# Patient Record
Sex: FEMALE | Race: WHITE | NOT HISPANIC OR LATINO | Employment: FULL TIME | URBAN - METROPOLITAN AREA
[De-identification: names, ages, dates, MRNs, and addresses within clinical notes are randomized per-mention and may not be internally consistent; named-entity substitution may affect disease eponyms.]

---

## 2023-11-07 PROBLEM — M54.2 CERVICALGIA OF OCCIPITO-ATLANTO-AXIAL REGION: Status: ACTIVE | Noted: 2023-11-07

## 2023-11-07 PROBLEM — H52.201 ASTIGMATISM OF RIGHT EYE: Status: ACTIVE | Noted: 2023-11-07

## 2023-11-07 PROBLEM — T50.905A ADVERSE EFFECT OF DRUG/MEDICINAL: Status: ACTIVE | Noted: 2023-11-07

## 2023-11-07 PROBLEM — G89.29 CHRONIC SCAPULAR PAIN: Status: ACTIVE | Noted: 2023-11-07

## 2023-11-07 PROBLEM — R51.9 CHRONIC HEADACHES: Status: ACTIVE | Noted: 2023-11-07

## 2023-11-07 PROBLEM — G89.29 CHRONIC HEADACHES: Status: ACTIVE | Noted: 2023-11-07

## 2023-11-07 PROBLEM — F41.9 ANXIETY: Status: ACTIVE | Noted: 2023-11-07

## 2023-11-07 PROBLEM — H90.3 BILATERAL SENSORINEURAL HEARING LOSS: Status: ACTIVE | Noted: 2023-11-07

## 2023-11-07 PROBLEM — M89.8X1 CHRONIC SCAPULAR PAIN: Status: ACTIVE | Noted: 2023-11-07

## 2023-11-07 PROBLEM — M21.611 BUNION, RIGHT: Status: ACTIVE | Noted: 2023-11-07

## 2023-11-07 PROBLEM — H04.123 BILATERAL DRY EYES: Status: ACTIVE | Noted: 2023-11-07

## 2023-11-07 PROBLEM — H52.13 MYOPIA, BILATERAL: Status: ACTIVE | Noted: 2023-11-07

## 2023-11-07 PROBLEM — J34.89 NASAL DRAINAGE: Status: ACTIVE | Noted: 2023-11-07

## 2023-11-07 PROBLEM — K21.9 LARYNGOPHARYNGEAL REFLUX (LPR): Status: ACTIVE | Noted: 2023-11-07

## 2023-11-07 PROBLEM — R87.610 ASCUS WITH POSITIVE HIGH RISK HPV CERVICAL: Status: ACTIVE | Noted: 2023-11-07

## 2023-11-07 PROBLEM — N87.0 CIN I (CERVICAL INTRAEPITHELIAL NEOPLASIA I): Status: ACTIVE | Noted: 2023-11-07

## 2023-11-07 PROBLEM — L65.9 HAIR THINNING: Status: ACTIVE | Noted: 2023-11-07

## 2023-11-07 PROBLEM — R14.3 FLATULENCE: Status: ACTIVE | Noted: 2023-11-07

## 2023-11-07 PROBLEM — S60.559A SPLINTER OF HAND: Status: ACTIVE | Noted: 2023-11-07

## 2023-11-07 PROBLEM — J34.89 SINUS PRESSURE: Status: ACTIVE | Noted: 2023-11-07

## 2023-11-07 PROBLEM — R53.83 FATIGUE: Status: ACTIVE | Noted: 2023-11-07

## 2023-11-07 PROBLEM — L25.9 CONTACT DERMATITIS: Status: ACTIVE | Noted: 2023-11-07

## 2023-11-07 PROBLEM — R87.612 LOW GRADE SQUAMOUS INTRAEPITHELIAL LESION (LGSIL) ON PAPANICOLAOU SMEAR OF CERVIX: Status: ACTIVE | Noted: 2023-11-07

## 2023-11-07 PROBLEM — R44.8 FACIAL PRESSURE: Status: ACTIVE | Noted: 2023-11-07

## 2023-11-07 PROBLEM — Z96.21 COCHLEAR IMPLANT IN PLACE: Status: ACTIVE | Noted: 2023-11-07

## 2023-11-07 PROBLEM — R87.810 ASCUS WITH POSITIVE HIGH RISK HPV CERVICAL: Status: ACTIVE | Noted: 2023-11-07

## 2023-11-07 PROBLEM — G47.9 DIFFICULTY SLEEPING: Status: ACTIVE | Noted: 2023-11-07

## 2023-11-07 RX ORDER — CLOTRIMAZOLE AND BETAMETHASONE DIPROPIONATE 10; .64 MG/G; MG/G
1 CREAM TOPICAL 2 TIMES DAILY
COMMUNITY
Start: 2019-11-05 | End: 2023-11-08 | Stop reason: ALTCHOICE

## 2023-11-07 RX ORDER — MULTIVIT-MIN/IRON/FOLIC ACID/K 18-600-40
CAPSULE ORAL
COMMUNITY
End: 2023-11-08 | Stop reason: ALTCHOICE

## 2023-11-07 RX ORDER — ETONOGESTREL AND ETHINYL ESTRADIOL VAGINAL RING .015; .12 MG/D; MG/D
RING VAGINAL
COMMUNITY
Start: 2020-08-10 | End: 2023-11-08 | Stop reason: ALTCHOICE

## 2023-11-07 RX ORDER — MUPIROCIN 20 MG/G
OINTMENT TOPICAL 3 TIMES DAILY
COMMUNITY
Start: 2021-05-02 | End: 2023-11-08 | Stop reason: ALTCHOICE

## 2023-11-08 ENCOUNTER — LAB (OUTPATIENT)
Dept: LAB | Facility: LAB | Age: 28
End: 2023-11-08
Payer: COMMERCIAL

## 2023-11-08 ENCOUNTER — OFFICE VISIT (OUTPATIENT)
Dept: PRIMARY CARE | Facility: CLINIC | Age: 28
End: 2023-11-08
Payer: COMMERCIAL

## 2023-11-08 VITALS
SYSTOLIC BLOOD PRESSURE: 124 MMHG | DIASTOLIC BLOOD PRESSURE: 80 MMHG | OXYGEN SATURATION: 98 % | HEART RATE: 72 BPM | BODY MASS INDEX: 29.33 KG/M2 | HEIGHT: 69 IN | WEIGHT: 198 LBS

## 2023-11-08 DIAGNOSIS — Z83.49 FAMILY HISTORY OF THYROID DISEASE: ICD-10-CM

## 2023-11-08 DIAGNOSIS — Z82.49 FAMILY HISTORY OF HEART DISEASE: ICD-10-CM

## 2023-11-08 DIAGNOSIS — Z00.00 WELL ADULT EXAM: ICD-10-CM

## 2023-11-08 DIAGNOSIS — Z83.3 FAMILY HISTORY OF DIABETES MELLITUS: ICD-10-CM

## 2023-11-08 DIAGNOSIS — Z00.00 WELL ADULT EXAM: Primary | ICD-10-CM

## 2023-11-08 DIAGNOSIS — Z96.21 COCHLEAR IMPLANT IN PLACE: ICD-10-CM

## 2023-11-08 LAB
ALBUMIN SERPL-MCNC: 4.7 G/DL (ref 3.5–5)
ALP BLD-CCNC: 56 U/L (ref 35–125)
ALT SERPL-CCNC: 11 U/L (ref 5–40)
ANION GAP SERPL CALC-SCNC: 14 MMOL/L
APPEARANCE UR: CLEAR
AST SERPL-CCNC: 16 U/L (ref 5–40)
BILIRUB SERPL-MCNC: 0.5 MG/DL (ref 0.1–1.2)
BILIRUB UR STRIP.AUTO-MCNC: NEGATIVE MG/DL
BUN SERPL-MCNC: 8 MG/DL (ref 8–25)
CALCIUM SERPL-MCNC: 9.3 MG/DL (ref 8.5–10.4)
CHLORIDE SERPL-SCNC: 103 MMOL/L (ref 97–107)
CHOLEST SERPL-MCNC: 193 MG/DL (ref 133–200)
CHOLEST/HDLC SERPL: 2.4 {RATIO}
CO2 SERPL-SCNC: 23 MMOL/L (ref 24–31)
COLOR UR: NORMAL
CREAT SERPL-MCNC: 0.7 MG/DL (ref 0.4–1.6)
EST. AVERAGE GLUCOSE BLD GHB EST-MCNC: 94 MG/DL
GFR SERPL CREATININE-BSD FRML MDRD: >90 ML/MIN/1.73M*2
GLUCOSE SERPL-MCNC: 91 MG/DL (ref 65–99)
GLUCOSE UR STRIP.AUTO-MCNC: NORMAL MG/DL
HBA1C MFR BLD: 4.9 %
HDLC SERPL-MCNC: 82 MG/DL
KETONES UR STRIP.AUTO-MCNC: NEGATIVE MG/DL
LDLC SERPL CALC-MCNC: 94 MG/DL (ref 65–130)
LEUKOCYTE ESTERASE UR QL STRIP.AUTO: NEGATIVE
NITRITE UR QL STRIP.AUTO: NEGATIVE
PH UR STRIP.AUTO: 6 [PH]
POTASSIUM SERPL-SCNC: 4.4 MMOL/L (ref 3.4–5.1)
PROT SERPL-MCNC: 6.9 G/DL (ref 5.9–7.9)
PROT UR STRIP.AUTO-MCNC: NEGATIVE MG/DL
RBC # UR STRIP.AUTO: NEGATIVE /UL
SODIUM SERPL-SCNC: 140 MMOL/L (ref 133–145)
SP GR UR STRIP.AUTO: 1.02
TRIGL SERPL-MCNC: 84 MG/DL (ref 40–150)
TSH SERPL DL<=0.05 MIU/L-ACNC: 1.95 MIU/L (ref 0.27–4.2)
UROBILINOGEN UR STRIP.AUTO-MCNC: NORMAL MG/DL

## 2023-11-08 PROCEDURE — 81003 URINALYSIS AUTO W/O SCOPE: CPT

## 2023-11-08 PROCEDURE — 83036 HEMOGLOBIN GLYCOSYLATED A1C: CPT

## 2023-11-08 PROCEDURE — 99395 PREV VISIT EST AGE 18-39: CPT | Performed by: NURSE PRACTITIONER

## 2023-11-08 PROCEDURE — 84443 ASSAY THYROID STIM HORMONE: CPT

## 2023-11-08 PROCEDURE — 1036F TOBACCO NON-USER: CPT | Performed by: NURSE PRACTITIONER

## 2023-11-08 PROCEDURE — 36415 COLL VENOUS BLD VENIPUNCTURE: CPT

## 2023-11-08 PROCEDURE — 80053 COMPREHEN METABOLIC PANEL: CPT

## 2023-11-08 PROCEDURE — 80061 LIPID PANEL: CPT

## 2023-11-08 ASSESSMENT — PATIENT HEALTH QUESTIONNAIRE - PHQ9
SUM OF ALL RESPONSES TO PHQ9 QUESTIONS 1 AND 2: 0
1. LITTLE INTEREST OR PLEASURE IN DOING THINGS: NOT AT ALL
2. FEELING DOWN, DEPRESSED OR HOPELESS: NOT AT ALL

## 2023-11-08 ASSESSMENT — PAIN SCALES - GENERAL: PAINLEVEL: 0-NO PAIN

## 2023-11-08 NOTE — PROGRESS NOTES
"Subjective   Patient ID: Tracy Ballesteros is a 28 y.o. female who presents for CPE (New patient.).    HPI   Tracy is a 27 yo F new patient who presents for CPE  Hx of Cochlear implants (sees ENT regularly), abnormal pap (sees GYN - due 12/23).  PMH/FMH/SMH reviewed and updated  Maternal family hx includes afib,hypothyroid,DM  Paternal family hx includes HTN, heart  Works as realtor  No complaints today  Needs  for insurance paperwork    Review of Systems  Constitutional Symptoms: negative for fever, loss of appetite, headaches, fatigue.   Eyes: negative for loss and blurring of vision, double vision.   Ear, Nose, Mouth, Throat: negative for hearing loss, tinnitus, nasal congestion, rhinorrhea, nose bleeds, teeth problems, mouth sores, gum disease, dysphagia, sore throat.   Cardiovascular: negative for chest pain/pressure, palpitations, edema, claudication.   Respiratory: negative for shortness of breath, dyspnea on exertion, pain with breathing, coughing.   Breast: negative for tenderness, masses, gynecomastia.   Gastrointestinal: negative for anorexia, indigestion, nausea, vomiting, abdominal pain, change in bowel habits, diarrhea, constipation, hematochaezia, melena, blood in stool.   : Negative for urinary or vaginal complaints. History of abnormal Pap, most recent Paps normal  Musculoskeletal: negative for joint pain, joint swelling, myalgias, cramps.   Integumentary: negative for change in mole, skin trouble or rash.   Neurological: negative for headache, numbness, tingling, weakness, tremors.   Psychiatric: negative for depression, anxiety.   Endocrine: negative for weight gain, heat or cold intolerance, polyuria, polydipsia, polyphagia.   Hematologic/Lymphatic: negative for bruising, abnormal bleeding, swollen glands.      Objective   /80   Pulse 72   Ht 1.753 m (5' 9\")   Wt 89.8 kg (198 lb)   LMP 10/22/2023 (Exact Date)   SpO2 98%   BMI 29.24 kg/m²     Physical Exam  General Appearance: " Comfortable. She is well nourished, and well developed. She is awake, alert, and oriented and appears her stated age. The patient is cooperative with exam.  Head: Hair pattern reveals a normal pattern for patients age and The face shows no abnormalities .  Eyes: PERRLA, EOMI, conjunctiva and sclerae clear. Extraocular muscle exam reveals EOMI.  Ears, Nose, Mouth, Throat: Cochlear implants. NOSE: Nasal mucosa in both nostrils reveals no polyps, ulcerations, or lesions. Oral mucosa reveals no abnormalities and Teeth reveal good repair .  Neck: Neck reveals supple, no adenopathy, no thyromegaly, or carotid bruits.  Chest: Lungs are clear to auscultation bilaterally with no wheezes, rales, or rhonchi.  Cardiovascular: RRR without MRG.  Breast: GYN.  Abdomen: Abdomen is soft, NT, ND with no masses. No CVAT  Genitourinary: GYN  Lymph Nodes: Bilateral axillary lymph nodes are unremarkable. Bilateral inguinal lymph nodes are unremarkable.  Musculoskeletal: 5/5 and equal strength in bilateral upper and lower extremities.  Skin: Skin reveals good turgor and no rashes .  Neurological: Neuro: Intact and non-focal. Cranial nerves II - XII are grossly intact.  Psychiatric: Patient has appropriate judgement. Patient has good insight. Patient's mood is appropriate.    Assessment/Plan   Diagnoses and all orders for this visit:  Well adult exam  -     Comprehensive Metabolic Panel; Future  -     Hemoglobin A1C; Future  -     Lipid Panel; Future  -     Urinalysis with Reflex Microscopic; Future  -     TSH with reflex to Free T4 if abnormal; Future  Healthy diet and exercise   Safety  See GYN and ENT as discussed  Obtain flu shot (pt will get at pharmacy)  Blood work ordered and she will drop off needed paperwork for insurance  Follow up 1 year for CPE and prn  Cochlear implant in place  Family history of diabetes mellitus  -     Comprehensive Metabolic Panel; Future  -     Hemoglobin A1C; Future  Family history of heart disease  -      Comprehensive Metabolic Panel; Future  -     Lipid Panel; Future  Family history of thyroid disease  -     TSH with reflex to Free T4 if abnormal; Future

## 2023-11-08 NOTE — PATIENT INSTRUCTIONS
Thank you for choosing our office for your health care needs.     Please work on healthy lifestyle, including whole foods and regular exercise.     Blood work is ordered - please obtain at your convenience.   We can complete any needed forms once the results are back.     Please obtain your flu shot this season.

## 2023-11-10 ENCOUNTER — APPOINTMENT (OUTPATIENT)
Dept: OBSTETRICS AND GYNECOLOGY | Facility: CLINIC | Age: 28
End: 2023-11-10
Payer: COMMERCIAL

## 2023-12-01 ENCOUNTER — APPOINTMENT (OUTPATIENT)
Dept: OBSTETRICS AND GYNECOLOGY | Facility: CLINIC | Age: 28
End: 2023-12-01
Payer: COMMERCIAL

## 2023-12-29 ENCOUNTER — OFFICE VISIT (OUTPATIENT)
Dept: OBSTETRICS AND GYNECOLOGY | Facility: CLINIC | Age: 28
End: 2023-12-29
Payer: COMMERCIAL

## 2023-12-29 VITALS
HEIGHT: 69 IN | BODY MASS INDEX: 30.21 KG/M2 | WEIGHT: 204 LBS | DIASTOLIC BLOOD PRESSURE: 70 MMHG | SYSTOLIC BLOOD PRESSURE: 118 MMHG

## 2023-12-29 DIAGNOSIS — Z01.419 WELL WOMAN EXAM WITH ROUTINE GYNECOLOGICAL EXAM: Primary | ICD-10-CM

## 2023-12-29 PROBLEM — M21.611 BUNION, RIGHT: Status: RESOLVED | Noted: 2023-11-07 | Resolved: 2023-12-29

## 2023-12-29 PROBLEM — S60.559A SPLINTER OF HAND: Status: RESOLVED | Noted: 2023-11-07 | Resolved: 2023-12-29

## 2023-12-29 PROBLEM — R87.612 LOW GRADE SQUAMOUS INTRAEPITHELIAL LESION (LGSIL) ON PAPANICOLAOU SMEAR OF CERVIX: Status: RESOLVED | Noted: 2023-11-07 | Resolved: 2023-12-29

## 2023-12-29 PROBLEM — R51.9 CHRONIC HEADACHES: Status: RESOLVED | Noted: 2023-11-07 | Resolved: 2023-12-29

## 2023-12-29 PROBLEM — G89.29 CHRONIC HEADACHES: Status: RESOLVED | Noted: 2023-11-07 | Resolved: 2023-12-29

## 2023-12-29 PROBLEM — L65.9 HAIR THINNING: Status: RESOLVED | Noted: 2023-11-07 | Resolved: 2023-12-29

## 2023-12-29 PROBLEM — G89.29 CHRONIC SCAPULAR PAIN: Status: RESOLVED | Noted: 2023-11-07 | Resolved: 2023-12-29

## 2023-12-29 PROBLEM — R14.3 FLATULENCE: Status: RESOLVED | Noted: 2023-11-07 | Resolved: 2023-12-29

## 2023-12-29 PROBLEM — R87.610 ASCUS WITH POSITIVE HIGH RISK HPV CERVICAL: Status: RESOLVED | Noted: 2023-11-07 | Resolved: 2023-12-29

## 2023-12-29 PROBLEM — Z96.21 PRESENCE OF COCHLEAR PROSTHESIS: Status: ACTIVE | Noted: 2022-11-16

## 2023-12-29 PROBLEM — M54.2 CERVICALGIA OF OCCIPITO-ATLANTO-AXIAL REGION: Status: RESOLVED | Noted: 2023-11-07 | Resolved: 2023-12-29

## 2023-12-29 PROBLEM — M89.8X1 CHRONIC SCAPULAR PAIN: Status: RESOLVED | Noted: 2023-11-07 | Resolved: 2023-12-29

## 2023-12-29 PROBLEM — N87.0 CERVICAL INTRAEPITHELIAL NEOPLASIA GRADE 1: Status: ACTIVE | Noted: 2022-12-29

## 2023-12-29 PROBLEM — H04.123 BILATERAL DRY EYES: Status: RESOLVED | Noted: 2023-11-07 | Resolved: 2023-12-29

## 2023-12-29 PROBLEM — G47.9 DIFFICULTY SLEEPING: Status: RESOLVED | Noted: 2023-11-07 | Resolved: 2023-12-29

## 2023-12-29 PROBLEM — R53.83 FATIGUE: Status: RESOLVED | Noted: 2023-11-07 | Resolved: 2023-12-29

## 2023-12-29 PROBLEM — K21.9 LARYNGOPHARYNGEAL REFLUX (LPR): Status: RESOLVED | Noted: 2023-11-07 | Resolved: 2023-12-29

## 2023-12-29 PROBLEM — L25.9 CONTACT DERMATITIS: Status: RESOLVED | Noted: 2023-11-07 | Resolved: 2023-12-29

## 2023-12-29 PROBLEM — R87.810 ASCUS WITH POSITIVE HIGH RISK HPV CERVICAL: Status: RESOLVED | Noted: 2023-11-07 | Resolved: 2023-12-29

## 2023-12-29 PROBLEM — N87.0 CIN I (CERVICAL INTRAEPITHELIAL NEOPLASIA I): Status: RESOLVED | Noted: 2023-11-07 | Resolved: 2023-12-29

## 2023-12-29 PROBLEM — F41.9 ANXIETY: Status: RESOLVED | Noted: 2023-11-07 | Resolved: 2023-12-29

## 2023-12-29 PROCEDURE — 1036F TOBACCO NON-USER: CPT | Performed by: NURSE PRACTITIONER

## 2023-12-29 PROCEDURE — 99395 PREV VISIT EST AGE 18-39: CPT | Performed by: NURSE PRACTITIONER

## 2023-12-29 ASSESSMENT — ENCOUNTER SYMPTOMS
ENDOCRINE NEGATIVE: 1
HEMATOLOGIC/LYMPHATIC NEGATIVE: 1
PSYCHIATRIC NEGATIVE: 1
CONSTITUTIONAL NEGATIVE: 1
CARDIOVASCULAR NEGATIVE: 1
RESPIRATORY NEGATIVE: 1
NEUROLOGICAL NEGATIVE: 1
MUSCULOSKELETAL NEGATIVE: 1
ALLERGIC/IMMUNOLOGIC NEGATIVE: 1
GASTROINTESTINAL NEGATIVE: 1
EYES NEGATIVE: 1

## 2023-12-29 NOTE — PROGRESS NOTES
"Chief Complaint    Annual Exam        HPI    NEW PATIENT - ANNUAL - NO CONCERNS - WAS SEEING [facility]    PAP - 12/29/2022 - NEG  MAMMO - NEVER  DEXA - NEVER  COLON - NEVER      Last edited by BRANDI Dewey-SUNNY on 12/29/2023  4:16 PM.         28 y.o. G0 female presents for annual well woman exam.   She is new with the practice.   Patient's menstrual cycles are regular every 27-32 days, lasting 7 days. Denies abnormal bleeding.  She is sexually active with . Denies dyspareunia.   Using NFP for birth control. Declines need for contraception at this time.   She denies any breast concerns.   H/O abnormal pap with +HPV, PATRICIA-1. Paps negative in 01/2021 and 12/2022.   Denies pelvic pain.  Denies abnormal vaginal symptoms.  Denies urinary or bowel concerns.   She is non-smoker  PMH: Hearing loss; bilateral, cochlear implants  Family h/o breast cancer: None  Family h/o GYN cancer: None  Family h/o colon cancer: None    /70   Ht 1.753 m (5' 9\")   Wt 92.5 kg (204 lb)   LMP 12/14/2023 (Exact Date)   BMI 30.13 kg/m²      Review of Systems   Constitutional: Negative.    HENT:  Positive for hearing loss.    Eyes: Negative.    Respiratory: Negative.     Cardiovascular: Negative.    Gastrointestinal: Negative.    Endocrine: Negative.    Genitourinary: Negative.    Musculoskeletal: Negative.    Skin: Negative.    Allergic/Immunologic: Negative.    Neurological: Negative.    Hematological: Negative.    Psychiatric/Behavioral: Negative.     All other systems reviewed and are negative.       Physical Exam  Constitutional:       Appearance: Normal appearance.   HENT:      Head: Normocephalic.      Nose: Nose normal.   Cardiovascular:      Rate and Rhythm: Normal rate and regular rhythm.   Pulmonary:      Effort: Pulmonary effort is normal.      Breath sounds: Normal breath sounds.   Chest:   Breasts:     Right: Normal.      Left: Normal.   Abdominal:      General: Abdomen is flat.      " Palpations: Abdomen is soft.   Genitourinary:     General: Normal vulva.      Vagina: Normal.      Cervix: Normal.      Uterus: Normal.       Adnexa: Right adnexa normal and left adnexa normal.      Rectum: Normal.      Comments: Pap not collected today.   Musculoskeletal:         General: Normal range of motion.      Cervical back: Normal range of motion and neck supple.   Skin:     General: Skin is warm and dry.   Neurological:      Mental Status: She is alert.   Psychiatric:         Mood and Affect: Mood normal.         Behavior: Behavior normal.     1. Well woman exam with routine gynecological exam  -Pap test not collected today. Last pap was in 12/2022 and was negative. Guidelines discussed.   -Self breast awareness exams reviewed.  -Declines contraception at this time. Advise daily PNV.   -Advised yearly well woman exams.   -Follow up sooner if needed.

## 2024-04-23 ENCOUNTER — TELEPHONE (OUTPATIENT)
Dept: OBSTETRICS AND GYNECOLOGY | Facility: CLINIC | Age: 29
End: 2024-04-23

## 2024-04-23 DIAGNOSIS — Z30.011 ENCOUNTER FOR INITIAL PRESCRIPTION OF CONTRACEPTIVE PILLS: Primary | ICD-10-CM

## 2024-04-23 RX ORDER — NORETHINDRONE 0.35 MG/1
1 TABLET ORAL DAILY
Qty: 28 TABLET | Refills: 11 | Status: SHIPPED | OUTPATIENT
Start: 2024-04-23 | End: 2025-04-23

## 2024-04-23 NOTE — TELEPHONE ENCOUNTER
Rx for Norethindrone sent to pharmacy. Called patient and left detailed message that she can  and start, should take the whole pack instead of just while she is on vacation. Advised breakthrough bleeding is always possible, so she may still have bleeding while on vacation. Advised I am in the office all week if she has any questions.   Mi March, DO

## 2024-06-17 ENCOUNTER — CLINICAL SUPPORT (OUTPATIENT)
Dept: AUDIOLOGY | Facility: CLINIC | Age: 29
End: 2024-06-17
Payer: COMMERCIAL

## 2024-06-17 DIAGNOSIS — H90.3 BILATERAL SENSORINEURAL HEARING LOSS: Primary | ICD-10-CM

## 2024-06-17 DIAGNOSIS — Z96.21 COCHLEAR IMPLANT IN PLACE: ICD-10-CM

## 2024-06-17 PROCEDURE — 92604 REPROGRAM COCHLEAR IMPLT 7/>: CPT | Performed by: AUDIOLOGIST

## 2024-06-17 PROCEDURE — 92626 EVAL AUD FUNCJ 1ST HOUR: CPT | Performed by: AUDIOLOGIST

## 2024-06-17 NOTE — PROGRESS NOTES
Chief Complaint   Cochlear implant programming/optimization; bilateral    History of Present Illness  Tracy Jauregui (formerly Lesli) was seen for programming and optimization of her bilateral sequentially placed Nucleus cochlear implants. She has received her upgraded N7 [id number] processor for the left ear in January 2019 and for the right ear in February 2021. Ms. 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 She underwent the surgical placement of a Nucleus N22 cochlear implant in the right cochlea on 10/28/1995 and the bilateral sequential placement of the Nucleus  Contour Advance cochlear implant in the left cochlea on 12/17/2010.      Mrs. Jauregui presents in P1 on the right and reports she rarely changes her programs. Tracy arrives today reporting she has been doing pretty well overall, but can tell that she is struggling to hear a bit recently, and that sound seems distorted . Her left ear is not as strong as her right ear, and as she primarily works from home, she has not been wearing the left side consistently over the past few years. 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    Results  2m magnet on right, 1m magnet on left; magnet site check satisfactory  Impedances were evaluated using the ear level [id number] speech processor for intra-cochlear electrodes 1-22 in common ground (CG), Monopolar 1 (MP1), Monopolar 2 (MP2) and MP1+2 stimulation modes.  Satisfactory impedances were found for all electrodes, in each stimulation mode. Variance noted for the left ear, consistent with less wear time. Impedances on the right side cannot be measured due to N22 implant.      The [id number] processors remain programmed in presenting parameters:   LEFT= ACE strategy, MP1+2 stim mode, 10 Maxima and a 900 Hz stim rate. (Datalog=0.2 hrs/day)  RIGHT= SPEAK strategy BP+1 stim mode, 10 Maxima and a 250 Hz stim  rate. (Datalog=7 hrs/day)  Using standard audiometric technique, a psychophysical map was created with good reliability for the right. The left side was not re-mapped today as recipient does not wear the left device.    Facial nerve stimulation was observed on select electrodes (16-19) on the right therefore C-levels were reduced accordingly on these channels to alleviate this.      P1=new psych MAP HOME  P2=GROUPS  P3=Cs reduced 2 units  P4=Cs increased 2 units      PERFORMANCE VERIFICATION  Functional gain was assessed while patient wore the [id number] processors together in P1 following reprogramming. Responses to narrowband noise ranged from 20-25 dBHL for 500-6000 Hz. Word discrimination was assessed using CNC words and [employer] sentences presented at 50 dBHL via recorded text.      BILATERAL CNC (@50 dB)=84%  BILATERAL [employer] in quiet (@50 dB) =95%  BILATERAL [employer] in noise (@50dB with 40 dB speech babble) =65%       Patient Discussion/Summary  Positive stimulation was obtained on electrodes 5-22 on each side. A reliable psychophysical map was defined. Appropriate behavior was observed when the Maps were tested. Performance verification shows good aided benefit from the cochlear implant system.    Treatment Plan -Cochlear Implant:   Utilize the Cochlear Implant System and speech processor daily in the preferred program.    Utilize the LEFT speech processor monaurally daily for short time intervals to enhance word understanding at that ear.    Return to Dr. Valdez annually or as directed for medical management.   Utilize aural rehabilitation/auditory training programs, books on tape, and written materials at home to improve speech understanding ability.     [id number]

## 2024-07-17 ENCOUNTER — CLINICAL SUPPORT (OUTPATIENT)
Dept: AUDIOLOGY | Facility: CLINIC | Age: 29
End: 2024-07-17
Payer: COMMERCIAL

## 2024-07-17 DIAGNOSIS — Z96.21 COCHLEAR IMPLANT IN PLACE: ICD-10-CM

## 2024-07-17 DIAGNOSIS — H90.3 BILATERAL SENSORINEURAL HEARING LOSS: Primary | ICD-10-CM

## 2024-07-17 PROCEDURE — 92604 REPROGRAM COCHLEAR IMPLT 7/>: CPT | Performed by: AUDIOLOGIST

## 2024-07-17 NOTE — PROGRESS NOTES
Chief Complaint   Cochlear implant programming/optimization/troubleshooting; bilateral    History of Present Illness  Tracy Jauregui (formerly Lesli) was seen for programming and optimization of her bilateral sequentially placed Nucleus cochlear implants. She has received her upgraded N7 [id number] processor for the left ear in January 2019 and for the right ear in February 2021. Ms. 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 She underwent the surgical placement of a Nucleus N22 cochlear implant in the right cochlea on 10/28/1995 and the bilateral sequential placement of the Nucleus  Contour Advance cochlear implant in the left cochlea on 12/17/2010.      Mrs. Jauregui presents in P1 on the right and reports she rarely changes her programs. Tracy arrives today reporting her right side has sounded off since her last session one month ago. She reported her 's voice as having a nasal/cartoon quality and would like to address this today.     Her left ear is not as strong as her right ear, and as she primarily works from home, she has not been wearing the left side consistently over the past few years. 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    Results  2m magnet on right, 1m magnet on left; magnet site check satisfactory  Impedances were evaluated using the ear level [id number] speech processor for intra-cochlear electrodes 1-22 in common ground (CG), Monopolar 1 (MP1), Monopolar 2 (MP2) and MP1+2 stimulation modes.  Satisfactory impedances were found for all electrodes, in each stimulation mode. Variance noted for the left ear, consistent with less wear time. Impedances on the right side cannot be measured due to N22 implant.      The [id number] processors remain programmed in presenting parameters:   LEFT= ACE strategy, MP1+2 stim mode, 10 Maxima and a 900 Hz stim rate. (Datalog=0.2 hrs/day)  RIGHT=  SPEAK strategy BP+1 stim mode, 10 Maxima and a 250 Hz stim rate. (Datalog=7 hrs/day)  Using standard audiometric technique, a psychophysical map was created with good reliability for the right. The left side was not re-mapped today as recipient does not wear the left device.    Facial nerve stimulation was observed on select electrodes (16-19) on the right therefore C-levels were reduced accordingly on these channels to alleviate this.      Map tilted down for low pitches due to possible distortion of lows and FNS.    P1 132=new psych MAP   P2 127=previous psych MAP from 6.17.24  P3 133=Cs reduced 2 units  P4 134=Cs increased 2 units      Patient Discussion/Summary  Positive stimulation was obtained on electrodes 5-22 on each side. A reliable psychophysical map was defined. Appropriate behavior was observed when the Maps were tested. Past performance verification shows good aided benefit from the cochlear implant system. Enrolled in Remote Check and Baseline check sent via [employer] portal.     Treatment Plan -Cochlear Implant:   Utilize the Cochlear Implant System and speech processor daily in the preferred program.    Utilize the LEFT speech processor monaurally daily for short time intervals to enhance word understanding at that ear.    Return to Dr. Valdez annually or as directed for medical management.   Utilize aural rehabilitation/auditory training programs, books on tape, and written materials at home to improve speech understanding ability.     [id number]

## 2024-08-28 ENCOUNTER — TELEPHONE (OUTPATIENT)
Dept: PRIMARY CARE | Facility: CLINIC | Age: 29
End: 2024-08-28
Payer: COMMERCIAL

## 2024-08-28 DIAGNOSIS — Z00.00 WELL ADULT EXAM: ICD-10-CM

## 2024-08-28 DIAGNOSIS — Z13.220 LIPID SCREENING: ICD-10-CM

## 2024-11-12 ENCOUNTER — TELEPHONE (OUTPATIENT)
Dept: PRIMARY CARE | Facility: CLINIC | Age: 29
End: 2024-11-12

## 2024-11-12 ENCOUNTER — APPOINTMENT (OUTPATIENT)
Dept: PRIMARY CARE | Facility: CLINIC | Age: 29
End: 2024-11-12
Payer: COMMERCIAL

## 2024-11-12 ENCOUNTER — LAB (OUTPATIENT)
Dept: LAB | Facility: LAB | Age: 29
End: 2024-11-12
Payer: COMMERCIAL

## 2024-11-12 VITALS
BODY MASS INDEX: 30.51 KG/M2 | HEART RATE: 68 BPM | SYSTOLIC BLOOD PRESSURE: 112 MMHG | DIASTOLIC BLOOD PRESSURE: 74 MMHG | WEIGHT: 206 LBS | HEIGHT: 69 IN | OXYGEN SATURATION: 98 %

## 2024-11-12 DIAGNOSIS — Z00.00 WELL ADULT EXAM: Primary | ICD-10-CM

## 2024-11-12 DIAGNOSIS — Z13.220 LIPID SCREENING: ICD-10-CM

## 2024-11-12 DIAGNOSIS — Z23 NEED FOR VACCINE FOR TD (TETANUS-DIPHTHERIA): ICD-10-CM

## 2024-11-12 DIAGNOSIS — Z00.00 WELL ADULT EXAM: ICD-10-CM

## 2024-11-12 LAB
ALBUMIN SERPL BCP-MCNC: 4.4 G/DL (ref 3.4–5)
ALP SERPL-CCNC: 53 U/L (ref 33–110)
ALT SERPL W P-5'-P-CCNC: 13 U/L (ref 7–45)
ANION GAP SERPL CALCULATED.3IONS-SCNC: 10 MMOL/L (ref 10–20)
AST SERPL W P-5'-P-CCNC: 16 U/L (ref 9–39)
BASOPHILS # BLD AUTO: 0.05 X10*3/UL (ref 0–0.1)
BASOPHILS NFR BLD AUTO: 0.8 %
BILIRUB SERPL-MCNC: 0.6 MG/DL (ref 0–1.2)
BUN SERPL-MCNC: 10 MG/DL (ref 6–23)
CALCIUM SERPL-MCNC: 9.2 MG/DL (ref 8.6–10.3)
CHLORIDE SERPL-SCNC: 104 MMOL/L (ref 98–107)
CHOLEST SERPL-MCNC: 178 MG/DL (ref 0–199)
CHOLEST/HDLC SERPL: 2.6 {RATIO}
CO2 SERPL-SCNC: 29 MMOL/L (ref 21–32)
CREAT SERPL-MCNC: 0.76 MG/DL (ref 0.5–1.05)
EGFRCR SERPLBLD CKD-EPI 2021: >90 ML/MIN/1.73M*2
EOSINOPHIL # BLD AUTO: 0.08 X10*3/UL (ref 0–0.7)
EOSINOPHIL NFR BLD AUTO: 1.2 %
ERYTHROCYTE [DISTWIDTH] IN BLOOD BY AUTOMATED COUNT: 11.5 % (ref 11.5–14.5)
GLUCOSE SERPL-MCNC: 87 MG/DL (ref 74–99)
HCT VFR BLD AUTO: 41.5 % (ref 36–46)
HDLC SERPL-MCNC: 68.8 MG/DL
HGB BLD-MCNC: 13.7 G/DL (ref 12–16)
IMM GRANULOCYTES # BLD AUTO: 0.01 X10*3/UL (ref 0–0.7)
IMM GRANULOCYTES NFR BLD AUTO: 0.2 % (ref 0–0.9)
LDLC SERPL CALC-MCNC: 95 MG/DL
LYMPHOCYTES # BLD AUTO: 2.07 X10*3/UL (ref 1.2–4.8)
LYMPHOCYTES NFR BLD AUTO: 31.6 %
MCH RBC QN AUTO: 29 PG (ref 26–34)
MCHC RBC AUTO-ENTMCNC: 33 G/DL (ref 32–36)
MCV RBC AUTO: 88 FL (ref 80–100)
MONOCYTES # BLD AUTO: 0.47 X10*3/UL (ref 0.1–1)
MONOCYTES NFR BLD AUTO: 7.2 %
NEUTROPHILS # BLD AUTO: 3.87 X10*3/UL (ref 1.2–7.7)
NEUTROPHILS NFR BLD AUTO: 59 %
NON HDL CHOLESTEROL: 109 MG/DL (ref 0–149)
NRBC BLD-RTO: 0 /100 WBCS (ref 0–0)
PLATELET # BLD AUTO: 245 X10*3/UL (ref 150–450)
POTASSIUM SERPL-SCNC: 4.2 MMOL/L (ref 3.5–5.3)
PROT SERPL-MCNC: 6.7 G/DL (ref 6.4–8.2)
RBC # BLD AUTO: 4.72 X10*6/UL (ref 4–5.2)
SODIUM SERPL-SCNC: 139 MMOL/L (ref 136–145)
TRIGL SERPL-MCNC: 73 MG/DL (ref 0–149)
VLDL: 15 MG/DL (ref 0–40)
WBC # BLD AUTO: 6.6 X10*3/UL (ref 4.4–11.3)

## 2024-11-12 PROCEDURE — 1036F TOBACCO NON-USER: CPT | Performed by: NURSE PRACTITIONER

## 2024-11-12 PROCEDURE — 99395 PREV VISIT EST AGE 18-39: CPT | Performed by: NURSE PRACTITIONER

## 2024-11-12 PROCEDURE — 3008F BODY MASS INDEX DOCD: CPT | Performed by: NURSE PRACTITIONER

## 2024-11-12 PROCEDURE — 36415 COLL VENOUS BLD VENIPUNCTURE: CPT

## 2024-11-12 PROCEDURE — 90471 IMMUNIZATION ADMIN: CPT | Performed by: NURSE PRACTITIONER

## 2024-11-12 PROCEDURE — 90715 TDAP VACCINE 7 YRS/> IM: CPT | Performed by: NURSE PRACTITIONER

## 2024-11-12 PROCEDURE — 80053 COMPREHEN METABOLIC PANEL: CPT

## 2024-11-12 PROCEDURE — 80061 LIPID PANEL: CPT

## 2024-11-12 PROCEDURE — 85025 COMPLETE CBC W/AUTO DIFF WBC: CPT

## 2024-11-12 ASSESSMENT — COLUMBIA-SUICIDE SEVERITY RATING SCALE - C-SSRS: 1. IN THE PAST MONTH, HAVE YOU WISHED YOU WERE DEAD OR WISHED YOU COULD GO TO SLEEP AND NOT WAKE UP?: NO

## 2024-11-12 ASSESSMENT — PATIENT HEALTH QUESTIONNAIRE - PHQ9
1. LITTLE INTEREST OR PLEASURE IN DOING THINGS: NOT AT ALL
SUM OF ALL RESPONSES TO PHQ9 QUESTIONS 1 AND 2: 0
2. FEELING DOWN, DEPRESSED OR HOPELESS: NOT AT ALL

## 2024-11-12 ASSESSMENT — PAIN SCALES - GENERAL: PAINLEVEL_OUTOF10: 0-NO PAIN

## 2024-11-12 NOTE — PROGRESS NOTES
"Subjective   Patient ID: Tracy Jauregui is a 29 y.o. female who presents for Annual Exam.    HPI   Tracy is a 30 yo F who presents for CPE  Hx of Cochlear implants (sees ENT regularly), abnormal pap (sees GYN - due 12/24).  PMH/FMH/SMH reviewed and updated  Maternal family hx includes afib,hypothyroid,DM  Paternal family hx includes HTN, heart    Diet is varied  Exercise is lacking  Works as realtor    Immunizations reviewed    GYN Jed   Pap 12/22    Needs bw for work insurance purposes    Review of Systems  Constitutional Symptoms: negative for fever, loss of appetite, headaches, fatigue.   Eyes: negative for loss and blurring of vision, double vision.   Ear, Nose, Mouth, Throat: negative for hearing loss, tinnitus, nasal congestion, rhinorrhea, nose bleeds, teeth problems, mouth sores, gum disease, dysphagia, sore throat.   Cardiovascular: negative for chest pain/pressure, palpitations, edema, claudication.   Respiratory: negative for shortness of breath, dyspnea on exertion, pain with breathing, coughing.   Breast: negative for tenderness, masses, gynecomastia.   Gastrointestinal: negative for anorexia, indigestion, nausea, vomiting, abdominal pain, change in bowel habits, diarrhea, constipation, hematochezia, melena, blood in stool.    : Negative for urinary or genital complaints  Musculoskeletal: negative for joint pain, joint swelling, myalgia, cramps.   Integumentary: negative for change in mole, skin trouble or rash.   Neurological: negative for headache, numbness, tingling, weakness, tremors.   Psychiatric: negative for depression, anxiety.   Endocrine: negative for weight gain, heat or cold intolerance, polyuria, polydipsia, polyphagia.   Hematologic/Lymphatic: negative for bruising, abnormal bleeding, swollen glands     Objective   /74   Pulse 68   Ht 1.753 m (5' 9\")   Wt 93.4 kg (206 lb)   SpO2 98%   BMI 30.42 kg/m²     Physical Exam  General Appearance: Comfortable. She is well " nourished, and well developed. She is awake, alert, and oriented and appears her stated age. The patient is cooperative with exam.  Head: Hair pattern reveals a normal pattern for patient's age and The face shows no abnormalities.  Eyes: PERRLA, EOMI, conjunctiva and sclera clear. Extraocular muscle exam reveals EOMI.  Ears, Nose, Mouth, Throat: Bilateral canals are normal. Both tympanic membranes are pearly gray and landmarks normal.  Right cochlear implant   Nasal mucosa in both nostrils reveals no polyps, ulcerations, or lesions. Oral mucosa reveals no abnormalities and Teeth reveal good repair.  Neck: Neck reveals supple, no adenopathy, no thyromegaly, or carotid bruits.  Chest: Lungs are clear to auscultation bilaterally with no wheezes, rales, or rhonchi.  Cardiovascular: RRR without MRG. No edema  Breast:  GYN  Abdomen: Abdomen with normoactive bowel sounds x4 quads, Abd is soft, NT, ND with no masses.  Genitourinary: gyn  Musculoskeletal: 5/5 and equal strength in bilateral upper and lower extremities.  Skin: Skin reveals good turgor and no rashes.  Neurological: Intact and non-focal. Cranial nerves II - XII are grossly intact.  Psychiatric: Patient has appropriate judgement. Patient has good insight. Patient's mood is appropriate.     Assessment/Plan   Diagnoses and all orders for this visit:  Well adult exam  29-year-old female well exam  Preventative screenings reviewed  Immunizations reviewed  Mediterranean diet, exercise, safety  Blood work ordered.  Patient to obtain and medical benefit incentive form can be completed  Follow up 1 year CPE    Need for vaccine for Td (tetanus-diphtheria)  -     Tdap vaccine, age 7 years and older  (BOOSTRIX)  Indications, benefits and risks/SE discussed

## 2025-03-28 ENCOUNTER — TELEPHONE (OUTPATIENT)
Dept: PRIMARY CARE | Facility: CLINIC | Age: 30
End: 2025-03-28
Payer: COMMERCIAL

## 2025-07-07 DIAGNOSIS — J34.89 NASAL DRAINAGE: ICD-10-CM

## 2025-07-07 DIAGNOSIS — J34.89 SINUS PRESSURE: ICD-10-CM

## 2025-07-07 DIAGNOSIS — R44.8 FACIAL PRESSURE: ICD-10-CM

## 2025-07-07 RX ORDER — DOXYCYCLINE 100 MG/1
100 TABLET ORAL 2 TIMES DAILY
Qty: 20 TABLET | Refills: 0 | Status: SHIPPED | OUTPATIENT
Start: 2025-07-07 | End: 2025-07-17

## 2025-07-07 NOTE — TELEPHONE ENCOUNTER
This patient states that since Tuesday 7/1/25, she has been experiencing a sore throat, nasal congestion, cheek tenderness, upper teeth tenderness, and facial pain. Sore throat has gone away. She is currently using Alkalol in saline rinses BID or TID, Afrin, Zyrtec, Tylenol, Advil and Pseudoephedrine. Discussed with Dr. Saleem who placed a verbal order for Doxycycline 100 mg PO BID x 10 days. Medication education provided to patient, advised patient to take antibiotic after meals, avoid dairy products 2 hours before and after administration, and encouraged a daily probiotic while on antibiotic. Patient educated on antibiotic causing sunlight sensitivity to skin and encouraged SPF use. Advised patient to discontinue medications if adverse effects. Patient agrees to plan of care and prescription sent to pharmacy.    Addendum 7/11/25:  Patient contacted Dr. Saleem stating that she continues to feel nasal congestion and a headache, feeling that her sinus infection has rebounded. Patient repots that she took her Doxycycline prescription for 5 days and has not fully recovered yet. Verbal order received from Dr. Saleem for Augmentin 875-125 mg BID for 7 days. Medication education provided to patient, advised patient to take medication after meals, encouraged a daily probiotic and advised to discontinue medications if adverse reactions.  Patient agrees to plan of care and prescription sent to patient pharmacy.

## 2025-07-11 RX ORDER — AMOXICILLIN AND CLAVULANATE POTASSIUM 875; 125 MG/1; MG/1
1 TABLET, FILM COATED ORAL 2 TIMES DAILY
Qty: 14 TABLET | Refills: 0 | Status: SHIPPED | OUTPATIENT
Start: 2025-07-11 | End: 2025-07-18

## 2025-11-13 ENCOUNTER — APPOINTMENT (OUTPATIENT)
Dept: PRIMARY CARE | Facility: CLINIC | Age: 30
End: 2025-11-13
Payer: COMMERCIAL